# Patient Record
Sex: MALE | Race: BLACK OR AFRICAN AMERICAN | NOT HISPANIC OR LATINO | Employment: UNEMPLOYED | URBAN - METROPOLITAN AREA
[De-identification: names, ages, dates, MRNs, and addresses within clinical notes are randomized per-mention and may not be internally consistent; named-entity substitution may affect disease eponyms.]

---
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
## 2023-01-04 ENCOUNTER — HOSPITAL ENCOUNTER (EMERGENCY)
Facility: HOSPITAL | Age: 18
Discharge: HOME | End: 2023-01-04
Attending: EMERGENCY MEDICINE
Payer: MEDICAID

## 2023-01-04 VITALS
OXYGEN SATURATION: 100 % | SYSTOLIC BLOOD PRESSURE: 118 MMHG | DIASTOLIC BLOOD PRESSURE: 62 MMHG | HEIGHT: 66 IN | HEART RATE: 76 BPM | RESPIRATION RATE: 16 BRPM | WEIGHT: 120 LBS | TEMPERATURE: 99 F | BODY MASS INDEX: 19.29 KG/M2

## 2023-01-04 DIAGNOSIS — R21 RASH AND NONSPECIFIC SKIN ERUPTION: Primary | ICD-10-CM

## 2023-01-04 DIAGNOSIS — U07.1 COVID-19: ICD-10-CM

## 2023-01-04 DIAGNOSIS — R30.0 DYSURIA: ICD-10-CM

## 2023-01-04 LAB
BACTERIA #/AREA URNS HPF: ABNORMAL /HPF
BILIRUB UR QL STRIP: NEGATIVE
CLARITY UR: CLEAR
COLOR UR: YELLOW
CTP QC/QA: YES
CTP QC/QA: YES
GLUCOSE UR QL STRIP: NEGATIVE
HGB UR QL STRIP: ABNORMAL
HYALINE CASTS #/AREA URNS LPF: 0 /LPF
KETONES UR QL STRIP: NEGATIVE
LEUKOCYTE ESTERASE UR QL STRIP: ABNORMAL
MICROSCOPIC COMMENT: ABNORMAL
NITRITE UR QL STRIP: NEGATIVE
PH UR STRIP: 7 [PH] (ref 5–8)
POC MOLECULAR INFLUENZA A AGN: NEGATIVE
POC MOLECULAR INFLUENZA B AGN: NEGATIVE
POCT GLUCOSE: 60 MG/DL (ref 70–110)
PROT UR QL STRIP: ABNORMAL
RBC #/AREA URNS HPF: 84 /HPF (ref 0–4)
SARS-COV-2 RDRP RESP QL NAA+PROBE: POSITIVE
SP GR UR STRIP: >1.03 (ref 1–1.03)
URN SPEC COLLECT METH UR: ABNORMAL
UROBILINOGEN UR STRIP-ACNC: NEGATIVE EU/DL
WBC #/AREA URNS HPF: 77 /HPF (ref 0–5)
WBC CLUMPS URNS QL MICRO: ABNORMAL

## 2023-01-04 PROCEDURE — 87635 SARS-COV-2 COVID-19 AMP PRB: CPT | Performed by: EMERGENCY MEDICINE

## 2023-01-04 PROCEDURE — 25000003 PHARM REV CODE 250: Performed by: EMERGENCY MEDICINE

## 2023-01-04 PROCEDURE — 99284 EMERGENCY DEPT VISIT MOD MDM: CPT

## 2023-01-04 PROCEDURE — 82962 GLUCOSE BLOOD TEST: CPT

## 2023-01-04 PROCEDURE — 87591 N.GONORRHOEAE DNA AMP PROB: CPT | Performed by: EMERGENCY MEDICINE

## 2023-01-04 PROCEDURE — 87491 CHLMYD TRACH DNA AMP PROBE: CPT | Performed by: EMERGENCY MEDICINE

## 2023-01-04 PROCEDURE — 96372 THER/PROPH/DIAG INJ SC/IM: CPT | Performed by: EMERGENCY MEDICINE

## 2023-01-04 PROCEDURE — 63600175 PHARM REV CODE 636 W HCPCS: Performed by: EMERGENCY MEDICINE

## 2023-01-04 PROCEDURE — 81000 URINALYSIS NONAUTO W/SCOPE: CPT | Performed by: EMERGENCY MEDICINE

## 2023-01-04 PROCEDURE — 87086 URINE CULTURE/COLONY COUNT: CPT | Performed by: EMERGENCY MEDICINE

## 2023-01-04 RX ORDER — CEFTRIAXONE 500 MG/1
0.5 INJECTION, POWDER, FOR SOLUTION INTRAMUSCULAR; INTRAVENOUS
Status: DISCONTINUED | OUTPATIENT
Start: 2023-01-04 | End: 2023-01-04

## 2023-01-04 RX ORDER — IBUPROFEN 600 MG/1
600 TABLET ORAL EVERY 8 HOURS PRN
Qty: 15 TABLET | Refills: 0 | Status: SHIPPED | OUTPATIENT
Start: 2023-01-04

## 2023-01-04 RX ORDER — DOXYCYCLINE 100 MG/1
100 CAPSULE ORAL 2 TIMES DAILY
Qty: 14 CAPSULE | Refills: 0 | Status: SHIPPED | OUTPATIENT
Start: 2023-01-04 | End: 2023-01-11

## 2023-01-04 RX ADMIN — CEFTRIAXONE 500 MG: 1 INJECTION, POWDER, FOR SOLUTION INTRAMUSCULAR; INTRAVENOUS at 06:01

## 2023-01-04 NOTE — ED PROVIDER NOTES
Encounter Date: 1/4/2023       History     Chief Complaint   Patient presents with    Rash     Pt reports rash, fever, nausea, and, abdominal pain x 1 week. Denies taking medication today.     Mr. Ernst reports about a week of a diffuse headache with pain behind his eyes. Denies neck pain. Is still able to perform normal adls. Also reports congestion, post nasal drip and mild cough. Has had a mild itching rash to his upper arms for a few days. Denies chest pain or sob. He also reports a few days of some dysuria with mild blood noted with urine intermittently. He states his girlfriend told him she had a STD a few weeks ago but he has never been evaluated or treated. He can't recall the name. He denies penile discharge or pain or scrotal swelling or pain.       Review of patient's allergies indicates:  No Known Allergies  Past Medical History:   Diagnosis Date    Asthma      Past Surgical History:   Procedure Laterality Date    LAPAROSCOPIC APPENDECTOMY N/A 11/25/2019    Procedure: APPENDECTOMY, LAPAROSCOPIC;  Surgeon: Lynda Chau MD;  Location: [facility] OR [address] [place] [address];  Service: Pediatrics;  Laterality: N/A;     No family history on file.  Social History     Tobacco Use    Smoking status: Never     Passive exposure: Yes   Substance Use Topics    Drug use: Never     Review of Systems   Constitutional:  Negative for chills and fatigue.   HENT:  Positive for congestion, postnasal drip, rhinorrhea and sore throat. Negative for facial swelling and mouth sores.    Respiratory:  Positive for cough. Negative for chest tightness, shortness of breath and wheezing.    Cardiovascular: Negative.    Gastrointestinal:  Negative for diarrhea, nausea and vomiting.   Genitourinary:  Positive for dysuria, frequency, hematuria and urgency. Negative for difficulty urinating, flank pain, genital sores, penile discharge, penile pain, penile swelling and scrotal swelling.   Neurological:  Positive for headaches. Negative for dizziness,  seizures, syncope, speech difficulty, light-headedness and numbness.   Hematological: Negative.    Psychiatric/Behavioral: Negative.     All other systems reviewed and are negative.    Physical Exam     Initial Vitals [01/04/23 1546]   BP Pulse Resp Temp SpO2   (!) 128/58 98 16 100 °F (37.8 °C) 100 %      MAP       --         Physical Exam    Nursing note and vitals reviewed.  Constitutional: He appears well-developed and well-nourished. He is not diaphoretic. No distress.   HENT:   Head: Normocephalic and atraumatic.   Mouth/Throat: Oropharynx is clear and moist. No oropharyngeal exudate.   Eyes: Conjunctivae and EOM are normal.   Neck: Neck supple.   Normal range of motion.  Cardiovascular:  Normal rate, regular rhythm and normal heart sounds.           No murmur heard.  Pulmonary/Chest: No stridor. No respiratory distress. He has no wheezes.   Abdominal: Abdomen is soft.   Genitourinary:    Penis normal.   No discharge found.    Genitourinary Comments: Small R inguinal lymph node swelling. No other abnl. Normal scrotal exam. No perineal swelling or ttp.      Musculoskeletal:         General: No tenderness or edema. Normal range of motion.      Cervical back: Normal range of motion and neck supple.     Lymphadenopathy:     He has no cervical adenopathy.   Neurological: He is alert and oriented to person, place, and time. He has normal strength. GCS score is 15. GCS eye subscore is 4. GCS verbal subscore is 5. GCS motor subscore is 6.   Skin: Skin is warm. Capillary refill takes less than 2 seconds. No erythema.   Mild nonspecific rash to L upper arm and lesser to forehead along scalp line w mild sandpaper appearance most c/w dermatitis. No vesicles, bulla, petechiae, or pustules.    Psychiatric: He has a normal mood and affect. Thought content normal.       ED Course   Procedures  Labs Reviewed   URINALYSIS, REFLEX TO URINE CULTURE - Abnormal; Notable for the following components:       Result Value    Specific  Gravity, UA >1.030 (*)     Protein, UA 1+ (*)     Occult Blood UA 2+ (*)     Leukocytes, UA 2+ (*)     All other components within normal limits    Narrative:     Specimen Source->Urine   URINALYSIS MICROSCOPIC - Abnormal; Notable for the following components:    RBC, UA 84 (*)     WBC, UA 77 (*)     Bacteria Few (*)     All other components within normal limits    Narrative:     Specimen Source->Urine   SARS-COV-2 RDRP GENE - Abnormal; Notable for the following components:    POC Rapid COVID Positive (*)     All other components within normal limits   POCT GLUCOSE - Abnormal; Notable for the following components:    POCT Glucose 60 (*)     All other components within normal limits   CULTURE, URINE    Narrative:     Specimen Source->Urine   C. TRACHOMATIS/N. GONORRHOEAE BY AMP DNA   POCT INFLUENZA A/B MOLECULAR          Imaging Results    None          Medications   cefTRIAXone (ROCEPHIN) 500 mg in LIDOcaine HCL 10 mg/ml (1%) 2 mL IM only syringe (500 mg Intramuscular Given 1/4/23 1852)     Medical Decision Making:   Clinical Tests:   Lab Tests: Ordered and Reviewed  ED Management:  Non toxic well appearing 16 yo here with sig other and mother.   Mild rash most c/w contact dermatitis and small LAD in R groin, no other abnl noted.   Normal gait. Has tested positive for covid. Is not vaccinated. No prior hx of covid.   We discussed sxs and home care. He is ambulating well on room air without any distress or sob or tachypnea.   We discussed penile sxs. Will test for gc/chl and will treat for presumptive sti. No ulcers or sores to suspect other additional etiology at this time.   Rocephin IM given, doxy prescribed.   We discussed home care and return precautions.   Also of note, his glucose was 60, given juice and crackers. He is healthy at baseline with no past med hx and is non toxic today. He states he hasn't eaten today. Will eat when he gets home. We discussed the need to hydrate and follow up w pcp.   Discussed  home care and return precautions. There is no indication for further emergent intervention or evaluation at this time.                           Clinical Impression:   Final diagnoses:  [R21] Rash and nonspecific skin eruption (Primary)  [U07.1] COVID-19  [R30.0] Dysuria        ED Disposition Condition    Discharge Stable          ED Prescriptions       Medication Sig Dispense Start Date End Date Auth. Provider    doxycycline (VIBRAMYCIN) 100 MG Cap Take 1 capsule (100 mg total) by mouth 2 (two) times daily. for 7 days 14 capsule 1/4/2023 1/11/2023 Brooklyn Kimble MD    ibuprofen (ADVIL,MOTRIN) 600 MG tablet Take 1 tablet (600 mg total) by mouth every 8 (eight) hours as needed for Pain. 15 tablet 1/4/2023 -- Brooklyn Kimble MD          Follow-up Information       Follow up With Specialties Details Why Contact [facility] - Emergency Dept Emergency Medicine  As needed, If symptoms worsen Brittni Walker  [facility] [address] [phone]             Brooklyn Kimble MD  01/05/23 2018

## 2023-01-04 NOTE — Clinical Note
"Daniel Buchananroselia Ernst was seen and treated in our emergency department on 1/4/2023.  He may return to work on 01/11/2023.  May return sooner if cleared by work due to duration of symptoms.      If you have any questions or concerns, please don't hesitate to call.      Brooklyn Kimble MD"

## 2023-01-05 ENCOUNTER — TELEPHONE (OUTPATIENT)
Dept: EMERGENCY MEDICINE | Facility: HOSPITAL | Age: 18
End: 2023-01-05
Payer: MEDICAID

## 2023-01-05 NOTE — DISCHARGE INSTRUCTIONS
Thank you for your patience. It was a pleasure taking care of you.   Rest. Drink plenty of fluids.   Alternate ibuprofen and tylenol as discussed.   Return for any new or acute problems or concerns.

## 2023-01-06 LAB
BACTERIA UR CULT: NORMAL
C TRACH DNA SPEC QL NAA+PROBE: DETECTED
N GONORRHOEA DNA SPEC QL NAA+PROBE: NOT DETECTED

## 2023-04-19 ENCOUNTER — HOSPITAL ENCOUNTER (EMERGENCY)
Facility: HOSPITAL | Age: 18
Discharge: HOME | End: 2023-04-19
Attending: EMERGENCY MEDICINE
Payer: MEDICAID

## 2023-04-19 VITALS
RESPIRATION RATE: 18 BRPM | HEIGHT: 64 IN | TEMPERATURE: 99 F | BODY MASS INDEX: 20.83 KG/M2 | HEART RATE: 56 BPM | DIASTOLIC BLOOD PRESSURE: 63 MMHG | WEIGHT: 122 LBS | OXYGEN SATURATION: 98 % | SYSTOLIC BLOOD PRESSURE: 136 MMHG

## 2023-04-19 DIAGNOSIS — J06.9 VIRAL URI WITH COUGH: Primary | ICD-10-CM

## 2023-04-19 LAB
CTP QC/QA: YES
CTP QC/QA: YES
POC MOLECULAR INFLUENZA A AGN: NEGATIVE
POC MOLECULAR INFLUENZA B AGN: NEGATIVE
SARS-COV-2 RDRP RESP QL NAA+PROBE: NEGATIVE

## 2023-04-19 PROCEDURE — 99284 EMERGENCY DEPT VISIT MOD MDM: CPT

## 2023-04-19 PROCEDURE — 25000003 PHARM REV CODE 250

## 2023-04-19 PROCEDURE — 87502 INFLUENZA DNA AMP PROBE: CPT

## 2023-04-19 RX ORDER — BENZONATATE 100 MG/1
100 CAPSULE ORAL 3 TIMES DAILY PRN
Qty: 30 CAPSULE | Refills: 0 | Status: SHIPPED | OUTPATIENT
Start: 2023-04-19

## 2023-04-19 RX ORDER — IBUPROFEN 600 MG/1
600 TABLET ORAL
Status: COMPLETED | OUTPATIENT
Start: 2023-04-19 | End: 2023-04-19

## 2023-04-19 RX ORDER — BUTALBITAL, ACETAMINOPHEN AND CAFFEINE 50; 325; 40 MG/1; MG/1; MG/1
1 TABLET ORAL EVERY 4 HOURS PRN
Qty: 10 TABLET | Refills: 0 | Status: SHIPPED | OUTPATIENT
Start: 2023-04-19

## 2023-04-19 RX ORDER — FLUTICASONE PROPIONATE 50 MCG
1 SPRAY, SUSPENSION (ML) NASAL 2 TIMES DAILY PRN
Qty: 15 G | Refills: 0 | Status: SHIPPED | OUTPATIENT
Start: 2023-04-19

## 2023-04-19 RX ORDER — ONDANSETRON 4 MG/1
4 TABLET, ORALLY DISINTEGRATING ORAL EVERY 6 HOURS PRN
Qty: 15 TABLET | Refills: 0 | Status: SHIPPED | OUTPATIENT
Start: 2023-04-19

## 2023-04-19 RX ORDER — PROMETHAZINE HYDROCHLORIDE AND DEXTROMETHORPHAN HYDROBROMIDE 6.25; 15 MG/5ML; MG/5ML
5 SYRUP ORAL EVERY 4 HOURS PRN
Qty: 118 ML | Refills: 0 | Status: SHIPPED | OUTPATIENT
Start: 2023-04-19

## 2023-04-19 RX ORDER — CETIRIZINE HYDROCHLORIDE 10 MG/1
10 TABLET ORAL DAILY PRN
Qty: 30 TABLET | Refills: 0 | Status: SHIPPED | OUTPATIENT
Start: 2023-04-19

## 2023-04-19 RX ORDER — IBUPROFEN 600 MG/1
600 TABLET ORAL EVERY 6 HOURS PRN
Qty: 20 TABLET | Refills: 0 | Status: SHIPPED | OUTPATIENT
Start: 2023-04-19

## 2023-04-19 RX ADMIN — IBUPROFEN 600 MG: 600 TABLET ORAL at 10:04

## 2023-04-19 NOTE — Clinical Note
"Daniel Sim" Klever was seen and treated in our emergency department on 4/19/2023.  He may return to work on 04/20/2023.       If you have any questions or concerns, please don't hesitate to call.      Alayna Holdsworth, PA-C"

## 2023-04-19 NOTE — DISCHARGE INSTRUCTIONS
Thank you for coming to our Emergency Department today. It is important to remember that some problems are difficult to diagnose and may not be found during your first visit. Be sure to follow up with your primary care doctor and review any labs/imaging that was performed with them. If you do not have a primary care doctor, you may contact the one listed on your discharge paperwork or you may also call the [facility] Appointment Desk at [phone] to schedule an appointment with one.     All medications may potentially have side effects and it is impossible to predict which medications may give you side effects. If you feel that you are having a negative effect of any medication you should immediately stop taking them and seek medical attention.    Return to the ER with any questions/concerns, new/concerning symptoms, worsening or failure to improve. Do not drive or make any important decisions for 24 hours if you have received any pain medications, sedatives or mood altering drugs during your ER visit.

## 2023-04-19 NOTE — ED PROVIDER NOTES
Encounter Date: 4/19/2023       History     Chief Complaint   Patient presents with    COVID-19 Concerns     16 yo male to triage for cough, runny nose, fever, chills, body aches, HA x 2 days. Pt also complaining of inner right cheek pain after biting it last night. VSS, NAD, AAOx4     CC: cough    HPI: This is a 17 y.o.male patient, with a PMHx of asthma, presenting to the ED for further evaluation of cough beginning 2 days ago.  Patient complaining of a frontal headache that is throbbing and comes and goes, he rates it a 10/10.  Has not taken anything to make this better.  States sound makes it worse.  Patient denies any trauma.  Patient states his nephews sick with similar symptoms but no diagnosis.  Patient admits to subjective fever, sneezing, congestion, dry cough, nausea, body aches.  Patient denies sweats, chills, runny nose, sore throat, shortness breath, chest pain, abdominal pain, vomiting, diarrhea, constipation, urinary symptoms, dizziness, lightheadedness, and visual disturbances.      The history is provided by the patient. No  was used.   Review of patient's allergies indicates:  No Known Allergies  Past Medical History:   Diagnosis Date    Asthma      Past Surgical History:   Procedure Laterality Date    LAPAROSCOPIC APPENDECTOMY N/A 11/25/2019    Procedure: APPENDECTOMY, LAPAROSCOPIC;  Surgeon: Lynda Chau MD;  Location: [facility] OR [address] [place] [address];  Service: Pediatrics;  Laterality: N/A;     History reviewed. No pertinent family history.  Social History     Tobacco Use    Smoking status: Never     Passive exposure: Yes   Substance Use Topics    Alcohol use: Not Currently    Drug use: Never     Review of Systems   Constitutional:  Positive for fever (Subjective). Negative for chills and diaphoresis.   HENT:  Positive for congestion and sneezing. Negative for rhinorrhea and sore throat.    Eyes:  Negative for visual disturbance.   Respiratory:  Positive for cough. Negative for  shortness of breath.    Cardiovascular:  Negative for chest pain.   Gastrointestinal:  Positive for nausea. Negative for abdominal pain, constipation, diarrhea and vomiting.   Genitourinary:  Negative for decreased urine volume, difficulty urinating, dysuria, frequency and urgency.   Musculoskeletal:  Positive for myalgias (body aches). Negative for back pain.   Skin:  Negative for rash.   Neurological:  Positive for headaches. Negative for dizziness, weakness, light-headedness and numbness.     Physical Exam     Initial Vitals [04/19/23 0937]   BP Pulse Resp Temp SpO2   136/63 (!) 56 18 98.8 °F (37.1 °C) 98 %      MAP       --         Physical Exam    Nursing note and vitals reviewed.  Constitutional: He appears well-developed and well-nourished. He is not diaphoretic. He is active. He does not appear ill. No distress.   HENT:   Head: Normocephalic and atraumatic.   Right Ear: Tympanic membrane, external ear and ear canal normal.   Left Ear: Tympanic membrane, external ear and ear canal normal.   Nose: Nose normal.   Mouth/Throat: Uvula is midline, oropharynx is clear and moist and mucous membranes are normal.   Eyes: Conjunctivae, EOM and lids are normal. Pupils are equal, round, and reactive to light. Right eye exhibits no discharge. Left eye exhibits no discharge. No scleral icterus.   Neck: Neck supple.   Normal range of motion.   Full passive range of motion without pain.     Cardiovascular:  Normal rate and regular rhythm.           Pulmonary/Chest: Effort normal and breath sounds normal. No respiratory distress.   Abdominal: He exhibits no distension.   Musculoskeletal:         General: Normal range of motion.      Cervical back: Full passive range of motion without pain, normal range of motion and neck supple.     Neurological: He is alert and oriented to person, place, and time. GCS eye subscore is 4. GCS verbal subscore is 5. GCS motor subscore is 6.   Skin: Skin is dry. Capillary refill takes less than 2  seconds.       ED Course   Procedures  Labs Reviewed   POCT INFLUENZA A/B MOLECULAR   SARS-COV-2 RDRP GENE          Imaging Results    None          Medications   ibuprofen tablet 600 mg (600 mg Oral Given 4/19/23 1051)     Medical Decision Making:   History:   Old Medical Records: I decided to obtain old medical records.  Initial Assessment:   17 y.o.male patient, with a PMHx of asthma, presenting to the ED for further evaluation of cough.  Patient's chart and medical history reviewed.  Differential Diagnosis:   COVID  Flu  Strep throat  Viral URI  Clinical Tests:   Lab Tests: Ordered and Reviewed  ED Management:  Patient's vitals reviewed.  He is afebrile, no respiratory distress, nontoxic-appearing in the ED. patient's physical exam is unremarkable.  Patient given Motrin for his headache.  Patient is COVID and flu negative. Discussed with patient this is most likely a viral upper respiratory infection which will take time to clear from his system.  Discussed with patient to stay well rested and hydrated.  Discussed with him he can use ibuprofen and Tylenol as needed for the body aches.  I will send the patient home with high dose Motrin, Fioricet, Zofran, Flonase, Zyrtec, Tessalon Perles, and promethazine DM constant for symptomatic control. Patient agrees with this plan. Discussed with him strict return precautions, he verbalized understanding. Patient is stable for discharge.                           Clinical Impression:   Final diagnoses:  [J06.9] Viral URI with cough (Primary)        ED Disposition Condition    Discharge Stable          ED Prescriptions       Medication Sig Dispense Start Date End Date Auth. Provider    ibuprofen (ADVIL,MOTRIN) 600 MG tablet Take 1 tablet (600 mg total) by mouth every 6 (six) hours as needed for Pain. 20 tablet 4/19/2023 -- Alayna Holdsworth, PA-C    butalbital-acetaminophen-caffeine -40 mg (FIORICET, ESGIC) -40 mg per tablet Take 1 tablet by mouth every 4 (four)  hours as needed for Pain or Headaches. 10 tablet 4/19/2023 -- Alayna Holdsworth, PA-C    fluticasone propionate (FLONASE) 50 mcg/actuation nasal spray 1 spray (50 mcg total) by Each Nostril route 2 (two) times daily as needed for Rhinitis or Allergies. 15 g 4/19/2023 -- Alayna Holdsworth, PA-C    cetirizine (ZYRTEC) 10 MG tablet Take 1 tablet (10 mg total) by mouth daily as needed for Allergies or Rhinitis. 30 tablet 4/19/2023 -- Alayna Holdsworth, PA-C    promethazine-dextromethorphan (PROMETHAZINE-DM) 6.25-15 mg/5 mL Syrp Take 5 mLs by mouth every 4 (four) hours as needed (cough/congestion). 118 mL 4/19/2023 -- Alayna Holdsworth, PA-C    benzonatate (TESSALON) 100 MG capsule Take 1 capsule (100 mg total) by mouth 3 (three) times daily as needed for Cough. 30 capsule 4/19/2023 -- Alayna Holdsworth, PA-C    ondansetron (ZOFRAN-ODT) 4 MG TbDL Take 1 tablet (4 mg total) by mouth every 6 (six) hours as needed (Nausea). 15 tablet 4/19/2023 -- Alayna Holdsworth, PA-C          Follow-up Information       Follow up With Specialties Details Why Contact [facility] - Emergency Dept Emergency Medicine  If symptoms worsen [address] [place] [address] [phone]                 Alayna Holdsworth, PA-C  04/19/23 3613

## 2023-06-30 ENCOUNTER — PATIENT MESSAGE (OUTPATIENT)
Dept: RESEARCH | Facility: HOSPITAL | Age: 18
End: 2023-06-30
Payer: MEDICAID

## 2023-11-06 ENCOUNTER — HOSPITAL ENCOUNTER (EMERGENCY)
Facility: OTHER | Age: 18
Discharge: HOME | End: 2023-11-06
Attending: EMERGENCY MEDICINE
Payer: MEDICAID

## 2023-11-06 VITALS
WEIGHT: 120 LBS | OXYGEN SATURATION: 98 % | RESPIRATION RATE: 18 BRPM | BODY MASS INDEX: 20.6 KG/M2 | DIASTOLIC BLOOD PRESSURE: 70 MMHG | TEMPERATURE: 99 F | SYSTOLIC BLOOD PRESSURE: 122 MMHG | HEART RATE: 52 BPM

## 2023-11-06 DIAGNOSIS — S61.012A LACERATION OF LEFT THUMB WITHOUT FOREIGN BODY WITHOUT DAMAGE TO NAIL, INITIAL ENCOUNTER: Primary | ICD-10-CM

## 2023-11-06 LAB
HCV AB SERPL QL IA: NEGATIVE
HIV 1+2 AB+HIV1 P24 AG SERPL QL IA: NEGATIVE

## 2023-11-06 PROCEDURE — 90471 IMMUNIZATION ADMIN: CPT

## 2023-11-06 PROCEDURE — 63600175 PHARM REV CODE 636 W HCPCS

## 2023-11-06 PROCEDURE — 86803 HEPATITIS C AB TEST: CPT | Performed by: EMERGENCY MEDICINE

## 2023-11-06 PROCEDURE — 90715 TDAP VACCINE 7 YRS/> IM: CPT

## 2023-11-06 PROCEDURE — 25000003 PHARM REV CODE 250

## 2023-11-06 PROCEDURE — 99285 EMERGENCY DEPT VISIT HI MDM: CPT | Mod: 25

## 2023-11-06 PROCEDURE — 87389 HIV-1 AG W/HIV-1&-2 AB AG IA: CPT | Performed by: EMERGENCY MEDICINE

## 2023-11-06 RX ORDER — ACETAMINOPHEN 500 MG
1000 TABLET ORAL
Status: COMPLETED | OUTPATIENT
Start: 2023-11-06 | End: 2023-11-06

## 2023-11-06 RX ORDER — IBUPROFEN 600 MG/1
600 TABLET ORAL
Status: COMPLETED | OUTPATIENT
Start: 2023-11-06 | End: 2023-11-06

## 2023-11-06 RX ADMIN — BACITRACIN ZINC, NEOMYCIN, POLYMYXIN B: 400; 3.5; 5 OINTMENT TOPICAL at 12:11

## 2023-11-06 RX ADMIN — ACETAMINOPHEN 1000 MG: 500 TABLET ORAL at 12:11

## 2023-11-06 RX ADMIN — IBUPROFEN 600 MG: 600 TABLET, FILM COATED ORAL at 12:11

## 2023-11-06 RX ADMIN — TETANUS TOXOID, REDUCED DIPHTHERIA TOXOID AND ACELLULAR PERTUSSIS VACCINE, ADSORBED 0.5 ML: 5; 2.5; 8; 8; 2.5 SUSPENSION INTRAMUSCULAR at 12:11

## 2023-11-06 NOTE — FIRST PROVIDER EVALUATION
Emergency Department TeleTriage Encounter Note      CHIEF COMPLAINT    Chief Complaint   Patient presents with    Laceration     Lac to left thumb(currently wrapped and unable to visualize) since yesterday. He states that when he saw the blood yesterday from his thumb, he had a syncopal episode       VITAL SIGNS   Initial Vitals [11/06/23 1049]   BP Pulse Resp Temp SpO2   124/68 (!) 47 18 98.6 °F (37 °C) 98 %      MAP       --            ALLERGIES    Review of patient's allergies indicates:  No Known Allergies    PROVIDER TRIAGE NOTE  Patient presents with complaint of cutting his left thumb approximately 13 hours prior to arrival.  He states after seeing the blood he became hot and passed out.  He reports continued pain to the hand.      Phy:   Constitutional: well nourished, well developed, appearing stated age, NAD        Initial orders will be placed and care will be transferred to an alternate provider when patient is roomed for a full evaluation. Any additional orders and the final disposition will be determined by that provider.        ORDERS  Labs Reviewed - No data to display    ED Orders (720h ago, onward)      None              Virtual Visit Note: The provider triage portion of this emergency department evaluation and documentation was performed via [employer], a HIPAA-compliant telemedicine application, in concert with a tele-presenter in the room. A face to face patient evaluation with one of my colleagues will occur once the patient is placed in an emergency department room.      DISCLAIMER: This note was prepared with [employer] voice recognition transcription software. Garbled syntax, mangled pronouns, and other bizarre constructions may be attributed to that software system.

## 2023-11-06 NOTE — ED PROVIDER NOTES
Encounter Date: 11/6/2023       History     Chief Complaint   Patient presents with    Laceration     Lac to left thumb(currently wrapped and unable to visualize) since yesterday. He states that when he saw the blood yesterday from his thumb, he had a syncopal episode     Daniel Ernst is a 18 y.o. R hand dominant male presenting to the emergency department for evaluation of small laceration to the left thumb. Patient states that he was moving furniture around 9 pm last night when he accidentally cut his thumb on some metal. He states that he only cleaned the wound with water. He is not on blood thinners. Tetanus is not up to date. He reports some pain to the left thumb. Patient states that while he was cleaning the wound, he saw blood, passed out and hit his head on the ground. He currently reports headaches located near the occipital region. He denies dizziness, light-headedness, weakness, paresthesias, vision changes. No treatment prior to arrival.       The history is provided by the patient.     Review of patient's allergies indicates:  No Known Allergies  Past Medical History:   Diagnosis Date    Asthma      Past Surgical History:   Procedure Laterality Date    LAPAROSCOPIC APPENDECTOMY N/A 11/25/2019    Procedure: APPENDECTOMY, LAPAROSCOPIC;  Surgeon: Lynda Chau MD;  Location: [facility] OR [address] [place] [address];  Service: Pediatrics;  Laterality: N/A;     No family history on file.  Social History     Tobacco Use    Smoking status: Never     Passive exposure: Yes   Substance Use Topics    Alcohol use: Not Currently    Drug use: Never     Review of Systems   Constitutional:  Negative for chills and fever.   HENT:  Negative for congestion, rhinorrhea and sore throat.    Respiratory:  Negative for cough and shortness of breath.    Cardiovascular:  Negative for chest pain.   Gastrointestinal:  Negative for abdominal pain, diarrhea, nausea and vomiting.   Genitourinary:  Negative for dysuria, frequency and urgency.    Musculoskeletal:  Negative for back pain.        Positive for left thumb pain.    Skin:  Positive for wound (cut to left thumb). Negative for rash.   Neurological:  Positive for syncope and headaches. Negative for dizziness.   Psychiatric/Behavioral:  Negative for confusion.        Physical Exam     Initial Vitals [11/06/23 1049]   BP Pulse Resp Temp SpO2   124/68 (!) 47 18 98.6 °F (37 °C) 98 %      MAP       --         Physical Exam    Vitals reviewed.  Constitutional: He appears well-developed and well-nourished. No distress.   HENT:   Head: Normocephalic and atraumatic. Head is without raccoon's eyes and without Naranjo's sign.   Right Ear: Tympanic membrane, external ear and ear canal normal. No mastoid tenderness. No hemotympanum.   Left Ear: Tympanic membrane, external ear and ear canal normal. No mastoid tenderness. No hemotympanum.   Nose: Nose normal.   Mouth/Throat: Oropharynx is clear and moist.   Eyes: Conjunctivae and EOM are normal. Pupils are equal, round, and reactive to light.   Neck: Neck supple.   Normal range of motion.  Cardiovascular:  Normal rate, regular rhythm, normal heart sounds and intact distal pulses.           Pulses:       Radial pulses are 2+ on the left side.   Pulmonary/Chest: Breath sounds normal. No respiratory distress. He has no wheezes. He has no rhonchi. He has no rales. He exhibits no tenderness.   Musculoskeletal:         General: No edema.      Cervical back: Normal range of motion and neck supple.      Comments: Limited ROM of left thumb secondary to pain.  Good range of motion with isolated testing.     Neurological: He is alert and oriented to person, place, and time. He has normal strength. No cranial nerve deficit or sensory deficit. GCS score is 15. GCS eye subscore is 4. GCS verbal subscore is 5. GCS motor subscore is 6.   No focal neurologic deficits.  Sensation intact to light touch.  Neurovascularly intact.   Skin: Skin is warm and dry. Capillary refill takes  less than 2 seconds.   Approximately 0.5 cm linear laceration to dorsal aspect of base of left thumb. Able to visualize entire depth of wound. No foreign body visualized. No ligament or tendon involvement.    Psychiatric: He has a normal mood and affect. His behavior is normal. Judgment and thought content normal.         ED Course   Lac Repair    Date/Time: 11/7/2023 6:24 PM    Performed by: German Rosales PA-C  Authorized by: Tariq Donohue MD    Consent:     Consent obtained:  Verbal    Consent given by:  Patient    Risks, benefits, and alternatives were discussed: yes      Risks discussed:  Infection, pain, poor cosmetic result, need for additional repair and poor wound healing    Alternatives discussed:  No treatment  Universal protocol:     Procedure explained and questions answered to patient or proxy's satisfaction: yes      Relevant documents present and verified: yes      Patient identity confirmed:  Verbally with patient and arm band  Anesthesia:     Anesthesia method:  None  Laceration details:     Location:  Finger    Finger location:  L thumb    Length (cm):  0.5  Pre-procedure details:     Preparation:  Patient was prepped and draped in usual sterile fashion  Exploration:     Limited defect created (wound extended): no      Hemostasis achieved with:  Direct pressure    Wound exploration: wound explored through full range of motion and entire depth of wound visualized      Contaminated: no    Treatment:     Area cleansed with:  Povidone-iodine    Amount of cleaning:  Standard    Irrigation solution:  Sterile saline    Irrigation method:  Pressure wash    Debridement:  None    Undermining:  None    Scar revision: no    Skin repair:     Repair method:  Sutures and Steri-Strips    Wound skin closure material used: ethilon.    Number of Steri-Strips:  1  Approximation:     Approximation:  Close  Repair type:     Repair type:  Simple  Post-procedure details:     Dressing:  Antibiotic ointment and  adhesive bandage    Procedure completion:  Tolerated well, no immediate complications    Labs Reviewed   HIV 1 / 2 ANTIBODY    Narrative:     Release to patient->Immediate   HEPATITIS C ANTIBODY    Narrative:     Release to patient->Immediate          Imaging Results              X-Ray Finger 2 or More Views Left (Final result)  Result time 11/06/23 12:55:27      Final result by Althea Mason MD (11/06/23 12:55:27)                   Impression:      As above      Electronically signed by: Althea Mason MD  Date:    11/06/2023  Time:    12:55               Narrative:    EXAMINATION:  XR FINGER 2 OR MORE VIEWS LEFT    CLINICAL HISTORY:  left thumb pain/laceration;    TECHNIQUE:  AP, oblique and lateral view    COMPARISON:  None    FINDINGS:  Normal alignment, normal mineralization.  No fracture, no osseous lesions, no abnormal periosteal reaction.  Small soft tissue defect at the base of the thumb, no soft tissue air, no radiopaque foreign body.                                       CT Head Without Contrast (Final result)  Result time 11/06/23 12:36:08      Final result by Saeid Renteria MD (11/06/23 12:36:08)                   Impression:      No acute intracranial abnormality.      Electronically signed by: Saeid Renteria MD  Date:    11/06/2023  Time:    12:36               Narrative:    EXAMINATION:  CT HEAD WITHOUT CONTRAST    CLINICAL HISTORY:  Head trauma, moderate-severe;    TECHNIQUE:  Low dose axial CT images obtained throughout the head without intravenous contrast. Sagittal and coronal reconstructions were performed.    COMPARISON:  None.    FINDINGS:  Intracranial compartment: Brain appears normally formed.    Ventricles and sulci are normal in size for age without evidence of hydrocephalus. No extra-axial blood or fluid collections.    The brain parenchyma appears normal. No parenchymal mass, hemorrhage, edema or major vascular distribution infarct.    Skull/extracranial contents (limited  evaluation): No fracture. Mastoid air cells and paranasal sinuses are essentially clear.  Imaged portions of the orbits are within normal limits.                                       Medications   Tdap (BOOSTRIX) vaccine injection 0.5 mL (0.5 mLs Intramuscular Given 11/6/23 1237)   acetaminophen tablet 1,000 mg (1,000 mg Oral Given 11/6/23 1235)   ibuprofen tablet 600 mg (600 mg Oral Given 11/6/23 1235)   neomycin-bacitracin-polymyxin ointment ( Topical (Top) Given 11/6/23 1237)     Medical Decision Making  Amount and/or Complexity of Data Reviewed  Labs: ordered.  Radiology: ordered.    Risk  OTC drugs.  Prescription drug management.                          Medical Decision Making:   Initial Assessment:   Urgent evaluation of healthy 18-year-old male who presents with laceration to the dorsal aspect of the base of the left thumb.  States that he saw blood while cleaning the wound and passed out.  Patient states that he did hit his head.  He is not on blood thinners.  Tetanus is not up-to-date.  On exam, he is well-appearing and nontoxic.  Hemodynamically stable.  Afebrile in the ED. no focal neurological deficits.  No hematoma or signs of head trauma.  He does have approximately 0.5 cm linear laceration to the dorsal aspect of the base of the left thumb.  No active bleeding.  He does have full range of motion of the left thumb with isolated testing.  Able to visualize the entire depth of the wound.  No foreign body.  Given that the wound occurred more than 12 hours ago, will not close with sutures.  Plan for Steri-Strips and bacitracin ointment.  Will obtain CT head given head trauma along with syncope.  Will update tetanus.  Clinical Tests:   Radiological Study: Ordered and Reviewed  ED Management:  On review of CT head, no acute intracranial abnormality.  I updated the patient on CT results.  Laceration was repaired with Steri-Strips, bacitracin ointment, and a Band-Aid.  Patient was given proper wound care  instructions. Tdap updated today.  Patient verbalized understanding and agreement with this plan of care. He was given specific return precautions. Advised to follow up with PCP as needed. All questions and concerns addressed. He is stable for discharge.     This note was created with MModal Fluency Direct Dictation. Please excuse any spelling or grammatical errors.      Clinical Impression:   Final diagnoses:  [S61.012A] Laceration of left thumb without foreign body without damage to nail, initial encounter (Primary)        ED Disposition Condition    Discharge Stable          ED Prescriptions    None       Follow-up Information    None          German Rosales PA-C  11/07/23 1825       German Rosales PA-C  11/07/23 1826

## 2023-11-06 NOTE — DISCHARGE INSTRUCTIONS
Please keep the area clean and dry. You can clean with soap and water starting tomorrow. Apply steri strips and antibiotic ointment as needed.

## 2023-11-06 NOTE — ED TRIAGE NOTES
PT arrived with c/o laceration to L thumb and syncope.  Pt states he was moving boxes and accidentally cut proximal knuckle of L thumb on some metal.  Pt states he went to clean blood off thumb in bathroom and became very hot and diaphoretic.  The next thing he knew, he woke up on bathroom floor with pain to the back of his head.  Pt endorses mild SOB upon awakening.  Denies any chest discomfort.  Denies any dizziness or visual changes at present.  Denies taking any blood thinners, last tetanus 2016.  Pt answering questions appropriately, speaking in complete sentences, respirations even and unlabored.  Aao x 4.

## 2023-11-06 NOTE — ED NOTES
Pt. Has a 1cm. laceration to the left thumb. Pt. States yesterday he was moving furniture and cut his thumb on a piece of metal. Last T-Dap unkown. Bleeding is controlled. Pt.is alert and a GCS of 15 with no further complaints.

## 2023-12-01 ENCOUNTER — NURSE TRIAGE (OUTPATIENT)
Dept: ADMINISTRATIVE | Facility: CLINIC | Age: 18
End: 2023-12-01

## 2023-12-02 NOTE — TELEPHONE ENCOUNTER
Pt reports feels a lump in his scrotum and a painless rash, Pt advised to see a MD within 3 days per protocol via PCP office/UC/ED, Pt encouraged to call back with any worsening symptoms or questions. Pt verbalized understanding.    Reason for Disposition   [1] Painless rash (e.g., redness, tiny bumps, sore) AND [2] present > 24 hours    Additional Information   Negative: [1] Blood from end of penis AND [2] large amount   Negative: [1] Not circumcised AND [2] foreskin pulled back and stuck   Negative: [1] Looks infected (e.g., draining sore, ulcer, rash is painful to touch) AND [2] fever > 100.4 F (38.0 C)   Negative: [1] Unable to urinate (or only a few drops) > 4 hours AND [2] bladder feels very full (e.g., palpable bladder or strong urge to urinate)   Negative: [1] Prolonged unwanted erection (i.e., not related to sexual interest) AND [2] lasting > 4 hours   Negative: SEVERE pain (e.g., excruciating)   Negative: Patient sounds very sick or weak to the triager   Negative: [1] Pus or bloody discharge from the end of the penis AND [2] fever   Negative: [1] Pain or burning with passing urine AND [2] fever > 100.4 F (38.0 C)   Negative: [1] Pain or burning with passing urine AND [2] side (flank) or back pain present   Negative: Entire penis is swollen (i.e., edema)   Negative: [1] Antibiotic treatment > 3 days for STI (e.g., penile discharge from gonorrhea, chlamydia) AND [2] painful urination not improved   Negative: Pus (white, yellow) or bloody discharge from end of penis   Negative: Pain or burning with passing urine   Negative: [1] Not circumcised AND [2] swollen foreskin   Negative: [1] Tiny water blisters rash AND [2] 3 or more   Negative: Looks infected (e.g., draining sore, ulcer, rash is painful to touch)   Negative: Blood in urine (red, pink, or tea-colored)   Negative: Severe itching    Protocols used: Penis and Scrotum Symptoms-A-AH

## 2024-01-10 ENCOUNTER — IMMUNIZATION (OUTPATIENT)
Dept: URGENT CARE | Facility: CLINIC | Age: 19
End: 2024-01-10
Payer: MEDICAID

## 2024-01-10 ENCOUNTER — OCCUPATIONAL HEALTH (OUTPATIENT)
Dept: URGENT CARE | Facility: CLINIC | Age: 19
End: 2024-01-10
Payer: MEDICAID

## 2024-01-10 VITALS — TEMPERATURE: 99 F

## 2024-01-10 DIAGNOSIS — A15.9 TUBERCULOSIS: Primary | ICD-10-CM

## 2024-01-10 DIAGNOSIS — Z23 IMMUNIZATION DUE: Primary | ICD-10-CM

## 2024-01-10 PROCEDURE — 86580 TB INTRADERMAL TEST: CPT | Mod: S$GLB,,,

## 2024-01-10 PROCEDURE — 90686 IIV4 VACC NO PRSV 0.5 ML IM: CPT | Mod: S$GLB,,,

## 2024-01-10 PROCEDURE — 90471 IMMUNIZATION ADMIN: CPT | Mod: S$GLB,,,

## 2024-01-11 NOTE — PROGRESS NOTES
Subjective:      Patient ID: Daniel Ernst is a 18 y.o. male.    Vitals:  vitals were not taken for this visit.     Chief Complaint: Immunizations    Pt came in for flu vaccine only.  ROS   Objective:     Physical Exam    Assessment:     No diagnosis found.    Plan:       There are no diagnoses linked to this encounter.

## 2024-01-11 NOTE — PROGRESS NOTES
Subjective:      Patient ID: Daniel Ernst is a 18 y.o. male.    Vitals:  vitals were not taken for this visit.     Chief Complaint: No chief complaint on file.    Pt came in for PPD test only   ROS   Objective:     Physical Exam    Assessment:     No diagnosis found.    Plan:       There are no diagnoses linked to this encounter.

## 2024-01-20 ENCOUNTER — OCCUPATIONAL HEALTH (OUTPATIENT)
Dept: URGENT CARE | Facility: CLINIC | Age: 19
End: 2024-01-20
Payer: MEDICAID

## 2024-01-20 DIAGNOSIS — Z13.9 ENCOUNTER FOR SCREENING: Primary | ICD-10-CM

## 2024-01-20 PROCEDURE — 86580 TB INTRADERMAL TEST: CPT | Mod: S$GLB,,, | Performed by: PHYSICIAN ASSISTANT

## 2024-01-20 NOTE — PROGRESS NOTES
Pt is here for a PPD placement and was notified on when to return for PPD reading. Pt missed the time frame for 1/10 PPD placement and was given another one on today. Pt waited 72 hrs.

## 2024-01-23 LAB
TB INDURATION - 48 HR READ: NORMAL
TB INDURATION - 72 HR READ: 0 MM
TB SKIN TEST - 48 HR READ: NORMAL
TB SKIN TEST - 72 HR READ: NEGATIVE

## 2024-03-08 ENCOUNTER — TELEPHONE (OUTPATIENT)
Dept: OPHTHALMOLOGY | Facility: CLINIC | Age: 19
End: 2024-03-08
Payer: COMMERCIAL

## 2024-03-08 ENCOUNTER — HOSPITAL ENCOUNTER (EMERGENCY)
Facility: OTHER | Age: 19
Discharge: HOME | End: 2024-03-08
Attending: EMERGENCY MEDICINE
Payer: MEDICAID

## 2024-03-08 VITALS
BODY MASS INDEX: 21.63 KG/M2 | SYSTOLIC BLOOD PRESSURE: 121 MMHG | TEMPERATURE: 98 F | WEIGHT: 126 LBS | DIASTOLIC BLOOD PRESSURE: 68 MMHG | HEART RATE: 60 BPM | RESPIRATION RATE: 16 BRPM | OXYGEN SATURATION: 100 %

## 2024-03-08 DIAGNOSIS — M25.511 ACUTE PAIN OF RIGHT SHOULDER: ICD-10-CM

## 2024-03-08 DIAGNOSIS — S05.12XA CONTUSION OF LEFT ORBITAL TISSUES, INITIAL ENCOUNTER: Primary | ICD-10-CM
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
## 2024-03-08 PROCEDURE — 99284 EMERGENCY DEPT VISIT MOD MDM: CPT | Mod: 25

## 2024-03-08 PROCEDURE — 25000003 PHARM REV CODE 250: Performed by: EMERGENCY MEDICINE

## 2024-03-08 RX ORDER — GENTAMICIN SULFATE 3 MG/ML
2 SOLUTION/ DROPS OPHTHALMIC
Status: COMPLETED | OUTPATIENT
Start: 2024-03-08 | End: 2024-03-08

## 2024-03-08 RX ORDER — IBUPROFEN 600 MG/1
600 TABLET ORAL EVERY 6 HOURS PRN
Qty: 20 TABLET | Refills: 0 | Status: SHIPPED | OUTPATIENT
Start: 2024-03-08

## 2024-03-08 RX ORDER — ACETAMINOPHEN 500 MG
1000 TABLET ORAL
Status: COMPLETED | OUTPATIENT
Start: 2024-03-08 | End: 2024-03-08

## 2024-03-08 RX ORDER — ACETAMINOPHEN 500 MG
1000 TABLET ORAL EVERY 6 HOURS PRN
Qty: 50 TABLET | Refills: 0 | Status: SHIPPED | OUTPATIENT
Start: 2024-03-08

## 2024-03-08 RX ORDER — IBUPROFEN 600 MG/1
600 TABLET ORAL
Status: COMPLETED | OUTPATIENT
Start: 2024-03-08 | End: 2024-03-08

## 2024-03-08 RX ADMIN — ACETAMINOPHEN 1000 MG: 500 TABLET ORAL at 10:03

## 2024-03-08 RX ADMIN — GENTAMICIN SULFATE 2 DROP: 3 SOLUTION/ DROPS OPHTHALMIC at 12:03

## 2024-03-08 RX ADMIN — IBUPROFEN 600 MG: 600 TABLET, FILM COATED ORAL at 10:03

## 2024-03-08 NOTE — DISCHARGE INSTRUCTIONS
Mr. Ernst,    Your X ray and CT show no broken bones    Thank you for letting me care for you today! It was nice meeting you, and I hope you feel better soon.   If you would like access to your chart and what was done today please utilize the [facility].   Please come back to [facility] for all of your future medical needs.    Our goal in the emergency department is to always give you outstanding care and exceptional service. You may receive a survey by mail or e-mail in the next week regarding your experience in our ED. We would greatly appreciate you completing and returning the survey. Your feedback provides us with a way to recognize our staff who give very good care and it helps us learn how to improve when your experience was below our aspiration of excellence.     Sincerely,    Tariq Donohue MD  Board Certified Emergency Physician

## 2024-03-08 NOTE — Clinical Note
"Daniel Pangs" Klever was seen and treated in our emergency department on 3/8/2024.  He may return to work on 03/11/2024.       If you have any questions or concerns, please don't hesitate to call.      Tariq Donohue MD"
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
## 2024-03-16 ENCOUNTER — HOSPITAL ENCOUNTER (EMERGENCY)
Facility: HOSPITAL | Age: 19
Discharge: HOME | End: 2024-03-16
Attending: STUDENT IN AN ORGANIZED HEALTH CARE EDUCATION/TRAINING PROGRAM

## 2024-03-16 VITALS
RESPIRATION RATE: 19 BRPM | HEART RATE: 71 BPM | OXYGEN SATURATION: 97 % | HEIGHT: 66 IN | BODY MASS INDEX: 20.09 KG/M2 | TEMPERATURE: 99 F | SYSTOLIC BLOOD PRESSURE: 118 MMHG | DIASTOLIC BLOOD PRESSURE: 57 MMHG | WEIGHT: 125 LBS

## 2024-03-16 DIAGNOSIS — H10.9 CONJUNCTIVITIS OF LEFT EYE, UNSPECIFIED CONJUNCTIVITIS TYPE: Primary | ICD-10-CM

## 2024-03-16 PROCEDURE — 99283 EMERGENCY DEPT VISIT LOW MDM: CPT

## 2024-03-16 RX ORDER — CIPROFLOXACIN HYDROCHLORIDE 3 MG/ML
1 SOLUTION/ DROPS OPHTHALMIC
Qty: 10 ML | Refills: 0 | Status: SHIPPED | OUTPATIENT
Start: 2024-03-16 | End: 2024-03-26

## 2024-03-16 RX ORDER — CARBOXYMETHYLCELLULOSE SODIUM 10 MG/ML
15 SOLUTION/ DROPS OPHTHALMIC
Qty: 15 ML | Refills: 0 | Status: SHIPPED | OUTPATIENT
Start: 2024-03-16 | End: 2024-03-26

## 2024-03-16 NOTE — Clinical Note
"Daniel Sim" Klever was seen and treated in our emergency department on 3/16/2024.  He may return to work on 03/18/2024.       If you have any questions or concerns, please don't hesitate to call.      Emile Saez MD"

## 2024-03-17 NOTE — DISCHARGE INSTRUCTIONS

## 2024-03-17 NOTE — ED PROVIDER NOTES
Encounter Date: 3/16/2024       History     Chief Complaint   Patient presents with    Eye Pain     Left eye redness, swelling, itching for 1 wk     18-year-old male with left eye redness and itching for the last week.  He says he recently had URI symptoms.  He also says that he bumped in the eye by a friend a few days ago.  No prior history of intravitreal injections or instrumentation.      Review of patient's allergies indicates:  No Known Allergies  No past medical history on file.  No past surgical history on file.  No family history on file.     Review of Systems    Physical Exam     Initial Vitals [03/16/24 2028]   BP Pulse Resp Temp SpO2   (!) 118/57 71 19 98.7 °F (37.1 °C) 97 %      MAP       --         Physical Exam    Nursing note and vitals reviewed.  Constitutional: He appears well-developed and well-nourished.   HENT:   Head: Normocephalic and atraumatic.   Eyes: EOM are normal. Pupils are equal, round, and reactive to light.   Left eye with scleral injection.  No hyphema.  No hypopyon.  Fluorescein testing showing no uptake.  No foreign body on examination   Neck: Neck supple. No JVD present.   Normal range of motion.  Cardiovascular:  Normal rate and regular rhythm.           Pulmonary/Chest: No stridor.   Musculoskeletal:      Cervical back: Normal range of motion and neck supple.           ED Course   Procedures  Labs Reviewed   HIV 1 / 2 ANTIBODY   HEPATITIS C ANTIBODY          Imaging Results    None          Medications - No data to display  Medical Decision Making  As above.  Will treat as conjunctivitis with ophtho drops and refer to Ophthalmology.    Risk  OTC drugs.  Prescription drug management.                                      Clinical Impression:  Final diagnoses:  [H10.9] Conjunctivitis of left eye, unspecified conjunctivitis type (Primary)          ED Disposition Condition    Discharge Stable          ED Prescriptions       Medication Sig Dispense Start Date End Date Auth. Provider     ciprofloxacin HCl (CILOXAN) 0.3 % ophthalmic solution Place 1 drop into the left eye every 2 (two) hours. for 10 days 10 mL 3/16/2024 3/26/2024 Emile Saez MD    carboxymethylcellulose sodium (ARTIFICIAL TEARS, CMC,) 1 % Drop Apply 15 mLs to eye every 2 (two) hours. for 10 days 15 mL 3/16/2024 3/26/2024 Emile Saez MD          Follow-up Information       Follow up With Specialties Details Why Contact Info    [facility] - Emergency Dept Emergency Medicine Go to  As needed [id number]  [facility] [id number] [phone]             Emile Saez MD  03/16/24 9269

## 2024-03-17 NOTE — ED TRIAGE NOTES
Daniel Ernst, a 18 y.o. male presents to the ED w/ complaint of left eye redness/drainage and pain x 1 week    Triage note:  Chief Complaint   Patient presents with    Eye Pain     Left eye redness, swelling, itching for 1 wk     Review of patient's allergies indicates:  No Known Allergies  No past medical history on file.     LOC: The patient is awake, alert, aware of environment with an appropriate affect. Oriented x4, speaking appropriately  APPEARANCE: Pt resting comfortably, in no acute distress, pt is clean and well groomed, clothing properly fastened  SKIN:The skin is warm and dry, color consistent with ethnicity, patient has normal skin turgor and moist mucus membranes, no bruising noted   RESPIRATORY:Airway is open and patent, respirations are spontaneous, patient has a normal effort and rate, no accessory muscle use noted.  CARDIAC: Normal rate and rhythm, no peripheral edema noted  ABDOMEN: Soft, non tender, non distended. Denies n/v/d  NEUROLOGIC: PERRLA, facial expression is symmetrical, patient moving all extremities spontaneously, normal sensation in all extremities when touched with a finger.  Follows all commands appropriately  MUSCULOSKELETAL: Patient moving all extremities spontaneously, no obvious swelling or deformities noted.

## 2024-07-18 ENCOUNTER — HOSPITAL ENCOUNTER (EMERGENCY)
Facility: HOSPITAL | Age: 19
Discharge: HOME | End: 2024-07-18
Attending: STUDENT IN AN ORGANIZED HEALTH CARE EDUCATION/TRAINING PROGRAM

## 2024-07-18 VITALS
DIASTOLIC BLOOD PRESSURE: 74 MMHG | WEIGHT: 125 LBS | HEIGHT: 66 IN | TEMPERATURE: 99 F | HEART RATE: 51 BPM | RESPIRATION RATE: 16 BRPM | OXYGEN SATURATION: 99 % | SYSTOLIC BLOOD PRESSURE: 130 MMHG | BODY MASS INDEX: 20.09 KG/M2

## 2024-07-18 DIAGNOSIS — M25.512 LEFT SHOULDER PAIN: ICD-10-CM

## 2024-07-18 DIAGNOSIS — M25.532 LEFT WRIST PAIN: ICD-10-CM

## 2024-07-18 DIAGNOSIS — R36.9 PENILE DISCHARGE: Primary | ICD-10-CM

## 2024-07-18 LAB
BACTERIA #/AREA URNS HPF: ABNORMAL /HPF
BILIRUB UR QL STRIP: NEGATIVE
CLARITY UR: ABNORMAL
COLOR UR: YELLOW
GLUCOSE UR QL STRIP: NEGATIVE
HGB UR QL STRIP: ABNORMAL
HYALINE CASTS #/AREA URNS LPF: 0 /LPF
KETONES UR QL STRIP: NEGATIVE
LEUKOCYTE ESTERASE UR QL STRIP: ABNORMAL
MICROSCOPIC COMMENT: ABNORMAL
NITRITE UR QL STRIP: NEGATIVE
PH UR STRIP: 7 [PH] (ref 5–8)
PROT UR QL STRIP: ABNORMAL
RBC #/AREA URNS HPF: 55 /HPF (ref 0–4)
SP GR UR STRIP: 1.03 (ref 1–1.03)
URN SPEC COLLECT METH UR: ABNORMAL
UROBILINOGEN UR STRIP-ACNC: NEGATIVE EU/DL
WBC #/AREA URNS HPF: >100 /HPF (ref 0–5)

## 2024-07-18 PROCEDURE — 87086 URINE CULTURE/COLONY COUNT: CPT | Performed by: PHYSICIAN ASSISTANT

## 2024-07-18 PROCEDURE — 25000003 PHARM REV CODE 250: Performed by: NURSE PRACTITIONER

## 2024-07-18 PROCEDURE — 93005 ELECTROCARDIOGRAM TRACING: CPT

## 2024-07-18 PROCEDURE — 87591 N.GONORRHOEAE DNA AMP PROB: CPT | Performed by: PHYSICIAN ASSISTANT

## 2024-07-18 PROCEDURE — 96372 THER/PROPH/DIAG INJ SC/IM: CPT | Performed by: NURSE PRACTITIONER

## 2024-07-18 PROCEDURE — 87491 CHLMYD TRACH DNA AMP PROBE: CPT | Performed by: PHYSICIAN ASSISTANT

## 2024-07-18 PROCEDURE — 99284 EMERGENCY DEPT VISIT MOD MDM: CPT | Mod: 25

## 2024-07-18 PROCEDURE — 63600175 PHARM REV CODE 636 W HCPCS: Performed by: NURSE PRACTITIONER

## 2024-07-18 PROCEDURE — 81000 URINALYSIS NONAUTO W/SCOPE: CPT | Performed by: PHYSICIAN ASSISTANT

## 2024-07-18 PROCEDURE — 93010 ELECTROCARDIOGRAM REPORT: CPT | Mod: ,,, | Performed by: INTERNAL MEDICINE

## 2024-07-18 RX ORDER — DOXYCYCLINE HYCLATE 100 MG
100 TABLET ORAL
Status: COMPLETED | OUTPATIENT
Start: 2024-07-18 | End: 2024-07-18

## 2024-07-18 RX ORDER — NAPROXEN 500 MG/1
500 TABLET ORAL
Status: COMPLETED | OUTPATIENT
Start: 2024-07-18 | End: 2024-07-18

## 2024-07-18 RX ORDER — DOXYCYCLINE 100 MG/1
100 CAPSULE ORAL 2 TIMES DAILY
Qty: 14 CAPSULE | Refills: 0 | Status: SHIPPED | OUTPATIENT
Start: 2024-07-18

## 2024-07-18 RX ORDER — NAPROXEN 500 MG/1
500 TABLET ORAL 2 TIMES DAILY WITH MEALS
Qty: 14 TABLET | Refills: 0 | Status: SHIPPED | OUTPATIENT
Start: 2024-07-18

## 2024-07-18 RX ORDER — CYCLOBENZAPRINE HCL 10 MG
10 TABLET ORAL 3 TIMES DAILY PRN
Qty: 15 TABLET | Refills: 0 | Status: SHIPPED | OUTPATIENT
Start: 2024-07-18 | End: 2024-07-23

## 2024-07-18 RX ADMIN — NAPROXEN 500 MG: 500 TABLET ORAL at 08:07

## 2024-07-18 RX ADMIN — CEFTRIAXONE 500 MG: 1 INJECTION, POWDER, FOR SOLUTION INTRAMUSCULAR; INTRAVENOUS at 08:07

## 2024-07-18 RX ADMIN — DOXYCYCLINE HYCLATE 100 MG: 100 TABLET, COATED ORAL at 08:07

## 2024-07-19 LAB
OHS QRS DURATION: 94 MS
OHS QTC CALCULATION: 371 MS

## 2024-07-19 NOTE — DISCHARGE INSTRUCTIONS
Take antibiotics as ordered. You have been prescribed naprosyn (naproxen sodium), an anti-inflammatory.  Take this medication whether you feel you need it or not.  Do not take ibuprofen, naproxen or other NSAID's medications while taking this medication. You have been prescribed flexeril (cyclobenzaprine).  You have been given a medication that causes drowsiness.  Do not operate motor vehicles, drink alcohol, or operate heavy machinery while taking this medication.You have been prescribed flexeril (cyclobenzaprine).  You have been given a medication that causes drowsiness.  Do not operate motor vehicles, drink alcohol, or operate heavy machinery while taking this medication. Return to the Emergency Department for any worsening, change in condition, or any emergent concerns.

## 2024-07-19 NOTE — ED PROVIDER NOTES
"Encounter Date: 7/18/2024       History     Chief Complaint   Patient presents with    Shoulder Pain     Patient reports via POV for left shoulder pain x 2-3 weeks.  Reports pain radiating down left arm.  Patient also reports of urinary frequency and dysuria x 2-3 weeks.  Patient notes a small amount of "red" to his urine.  Pt denies fever, chills, n/v/d, or abdominal pain.  No prior tx.      Chief complaint: Left shoulder pain     History of present illness: Patient is a 18-year-old male who reports 2-3 weeks of left arm pain at the shoulder and wrist after lifting a heavy object.  Thinks he may have dislocated either.  Reports pain is 6/10 in addition he reports dysuria frequency urgency and hematuria as well as penile discharge that he reports is "cloudy.  His girlfriend is present with him and states she is currently under treatment for STIs although her testing has not returned.  he denies fever and abdominal pain    The history is provided by the patient.     Review of patient's allergies indicates:  No Known Allergies  Past Medical History:   Diagnosis Date    Asthma      Past Surgical History:   Procedure Laterality Date    LAPAROSCOPIC APPENDECTOMY N/A 11/25/2019    Procedure: APPENDECTOMY, LAPAROSCOPIC;  Surgeon: Lynda Chau MD;  Location: [facility] OR [address] [place] [address];  Service: Pediatrics;  Laterality: N/A;     No family history on file.  Social History     Tobacco Use    Smoking status: Never     Passive exposure: Yes   Substance Use Topics    Alcohol use: Not Currently    Drug use: Never     Review of Systems   Constitutional:  Negative for fever.   Gastrointestinal:  Negative for abdominal pain.   Genitourinary:  Positive for dysuria, frequency, penile discharge and urgency.   Musculoskeletal:  Negative for back pain.       Physical Exam     Initial Vitals [07/18/24 1828]   BP Pulse Resp Temp SpO2   123/64 83 16 98.5 °F (36.9 °C) 99 %      MAP       --         Physical Exam    Nursing note and vitals " reviewed.  Constitutional: He appears well-developed and well-nourished. He is not diaphoretic. No distress.   HENT:   Head: Normocephalic and atraumatic.   Right Ear: External ear normal.   Left Ear: External ear normal.   Nose: Nose normal.   Eyes: Pupils are equal, round, and reactive to light. Right eye exhibits no discharge. Left eye exhibits no discharge. No scleral icterus.   Neck:   Normal range of motion.  Pulmonary/Chest: No respiratory distress.   Abdominal: He exhibits no distension.   Musculoskeletal:         General: Normal range of motion.      Cervical back: Normal range of motion.      Comments: Full range of motion of the left shoulder left elbow left wrist are noted.  Distal P SM is intact.     Neurological: He is alert and oriented to person, place, and time.   Skin: Skin is dry. Capillary refill takes less than 2 seconds.         ED Course   Procedures  Labs Reviewed   URINALYSIS, REFLEX TO URINE CULTURE - Abnormal; Notable for the following components:       Result Value    Appearance, UA Hazy (*)     Protein, UA 1+ (*)     Occult Blood UA 1+ (*)     Leukocytes, UA 3+ (*)     All other components within normal limits    Narrative:     Specimen Source->Urine   URINALYSIS MICROSCOPIC - Abnormal; Notable for the following components:    RBC, UA 55 (*)     WBC, UA >100 (*)     All other components within normal limits    Narrative:     Specimen Source->Urine   CULTURE, URINE   C. TRACHOMATIS/N. GONORRHOEAE BY AMP DNA          Imaging Results              X-Ray Wrist Complete Left (Final result)  Result time 07/18/24 20:25:04      Final result by Anahi Yung MD (07/18/24 20:25:04)                   Impression:      No acute fracture.      Electronically signed by: Anahi Yung  Date:    07/18/2024  Time:    20:25               Narrative:    EXAMINATION:  THREE VIEWS OF THE LEFT WRIST    CLINICAL HISTORY:  Pain in left wrist    TECHNIQUE:  AP, oblique, and lateral views of the left  wrist    COMPARISON:  None.    FINDINGS:  Three views of the left wrist demonstrate no acute fracture or dislocation.                                       X-Ray Shoulder Trauma Left (Final result)  Result time 07/18/24 20:18:03      Final result by Elmo Schafer MD (07/18/24 20:18:03)                   Impression:      1. No acute displaced fracture or dislocation of the left shoulder.      Electronically signed by: Elmo Schafer MD  Date:    07/18/2024  Time:    20:18               Narrative:    EXAMINATION:  XR SHOULDER TRAUMA 3 VIEW LEFT    CLINICAL HISTORY:  Pain in left shoulder    TECHNIQUE:  Three views of the left shoulder were performed.    COMPARISON  None    FINDINGS:  The left humeral head maintains appropriate relationship with the glenoid.  The acromioclavicular joint is intact.  No acute displaced left rib fracture.  The left lung zones are clear.                                       X-Ray Chest PA And Lateral (Final result)  Result time 07/18/24 20:01:37      Final result by Anahi Yung MD (07/18/24 20:01:37)                   Impression:      No acute intrathoracic abnormality.      Electronically signed by: Anahi Yung  Date:    07/18/2024  Time:    20:01               Narrative:    EXAMINATION:  CHEST PA AND LATERAL    CLINICAL HISTORY:  Pain in left shoulder    TECHNIQUE:  PA and lateral chest radiograph    COMPARISON:  <None.>    FINDINGS:  The cardiac silhouette is within normal limits.  There is no focal consolidation, pneumothorax, or pleural effusion.                                       Medications   naproxen tablet 500 mg (500 mg Oral Given 7/18/24 2018)   cefTRIAXone (Rocephin) 500 mg in LIDOcaine HCL 10 mg/ml (1%) 2 mL IM only syringe (500 mg Intramuscular Given 7/18/24 2018)   doxycycline tablet 100 mg (100 mg Oral Given 7/18/24 2019)     Medical Decision Making  Patient is a 18-year-old male who reports 2-3 weeks of left arm pain at the shoulder and wrist after  "lifting a heavy object.  Thinks he may have dislocated either.  Reports pain is 6/10 in addition he reports dysuria frequency urgency and hematuria as well as penile discharge that he reports is "cloudy.  His girlfriend is present with him and states she is currently under treatment for STIs although her testing has not returned.  he denies fever and abdominal pain    Full range of motion of the left shoulder left elbow left wrist are noted.  Distal P SM is intact.  exam deferred.    Differential diagnosis includes UTI STI fracture dislocation sprain strain        Problems Addressed:  Left shoulder pain: acute illness or injury     Details: For musculoskeletal elements of the left shoulder and wrist I recommend the use of naproxen over-the-counter and Flexeril.  Left wrist pain: acute illness or injury  Penile discharge: acute illness or injury     Details: Likely STI.  Given Rocephin in the ED discharged on doxycycline.  Culture is pending.  Follow up as directed.    Amount and/or Complexity of Data Reviewed  Labs: ordered. Decision-making details documented in ED Course.  Radiology: ordered. Decision-making details documented in ED Course.  ECG/medicine tests: ordered. Decision-making details documented in ED Course.  Discussion of management or test interpretation with external provider(s): Vital signs at the time of disposition were:  /74 (BP Location: Left arm, Patient Position: Sitting)   Pulse (!) 51   Temp 98.5 °F (36.9 °C)   Resp 16   Ht 5' 6" (1.676 m)   Wt 56.7 kg (125 lb)   SpO2 99%   BMI 20.18 kg/m²       See AVS for additional recommendations. Medications listed herein were prescribed after reviewing the patient's allergies, medication list, history, most recent laboratories as available.  Referrals below were provided after reviewing the patient's previous medical providers. He understands he  should return for any worsening or changes in condition.  Prior to discharge the patient was " asked if he  had any additional concerns or complaints and he declined. The patient was given an opportunity to ask questions and all were answered to his satisfaction.     Risk  Prescription drug management.  Diagnosis or treatment significantly limited by social determinants of health.               ED Course as of 07/18/24 2306   Thu Jul 18, 2024 2000 Urinalysis Microscopic(!)  Positive for UTI.  Patient reports penile discharge.  We will give Rocephin and doxycycline. [VC]   2000 BP: 123/64 [VC]   2000 Temp: 98.5 °F (36.9 °C) [VC]   2000 Temp Source: Oral [VC]   2000 Pulse: 83 [VC]   2000 Resp: 16 [VC]   2000 SpO2: 99 % [VC]   2007 EKG 12-lead  No STEMI. [LP]   2022 X-Ray Shoulder Trauma Left    1. No acute displaced fracture or dislocation of the left shoulder.      [VC]   2022 X-Ray Chest PA And Lateral    No acute intrathoracic abnormality.      [VC]   2036 X-Ray Wrist Complete Left    No acute fracture.    [VC]      ED Course User Index  [LP] Rigoberto Hoang III, MD  [VC] Gasper Valerio DNP                           Clinical Impression:  Final diagnoses:  [M25.512] Left shoulder pain  [M25.532] Left wrist pain  [R36.9] Penile discharge (Primary)          ED Disposition Condition    Discharge Stable          ED Prescriptions       Medication Sig Dispense Start Date End Date Auth. Provider    doxycycline (MONODOX) 100 MG capsule Take 1 capsule (100 mg total) by mouth 2 (two) times daily. 14 capsule 7/18/2024 -- Gasper Valerio DNP    naproxen (NAPROSYN) 500 MG tablet Take 1 tablet (500 mg total) by mouth 2 (two) times daily with meals. 14 tablet 7/18/2024 -- Gasper Valerio DNP    cyclobenzaprine (FLEXERIL) 10 MG tablet Take 1 tablet (10 mg total) by mouth 3 (three) times daily as needed for Muscle spasms. 15 tablet 7/18/2024 7/23/2024 Gasper Valerio DNP          Follow-up Information       Follow up With Specialties Details Why Contact [facility]  Internal Medicine, Family Medicine Schedule an appointment as soon as possible for a visit   [address] [facility] [id number]  [phone]               Gasper Valerio, [place]  07/18/24 4751

## 2024-07-19 NOTE — ED NOTES
Patient having left sided shoulder pain x 2 weeks. Patient has old shoulder injury from fall. He states he was lifting something 2 weeks ago and it feels like his shoulder popped out of place. Pain 6/10. Denies numbness and tingling in his arms/wrists at this time. +2 radial pulses palpated bilaterally. Denies any other complaints at this time. Negative PMH per patient report. NKDA.

## 2024-07-20 LAB — BACTERIA UR CULT: NO GROWTH

## 2024-07-23 LAB
C TRACH DNA SPEC QL NAA+PROBE: DETECTED
N GONORRHOEA DNA SPEC QL NAA+PROBE: NOT DETECTED

## 2024-10-08 DIAGNOSIS — Z20.2 EXPOSURE TO CHLAMYDIA: Primary | ICD-10-CM

## 2024-10-08 RX ORDER — AZITHROMYCIN 500 MG/1
1000 TABLET, FILM COATED ORAL DAILY
Qty: 2 TABLET | Refills: 0 | Status: SHIPPED | OUTPATIENT
Start: 2024-10-08

## 2024-11-08 ENCOUNTER — OCCUPATIONAL HEALTH (OUTPATIENT)
Dept: URGENT CARE | Facility: CLINIC | Age: 19
End: 2024-11-08

## 2024-11-08 DIAGNOSIS — Z13.9 ENCOUNTER FOR SCREENING: Primary | ICD-10-CM

## (undated) DEVICE — SUT PLAIN 5-0 FS2 27IN YEL

## (undated) DEVICE — SPONGE DERMA 8PLY 2X2

## (undated) DEVICE — SYR 10CC LUER LOCK

## (undated) DEVICE — TROCAR ENDOPATH XCEL 12X100MM

## (undated) DEVICE — DRESSING TRANS 2X2 TEGADERM

## (undated) DEVICE — DRAPE STERI-DRAPE 1000 17X11IN

## (undated) DEVICE — TRAY MINOR GEN SURG

## (undated) DEVICE — GLOVE PI ULTRA TOUCH G SURGEON

## (undated) DEVICE — TUBING HF INSUFFLATION W/ FLTR

## (undated) DEVICE — BLADE SURG STAINLESS STEEL #15

## (undated) DEVICE — SUT MONOCRYL 5-0 P-3 UND 18

## (undated) DEVICE — KIT ANTIFOG

## (undated) DEVICE — GOWN SURGICAL X-LARGE

## (undated) DEVICE — ADHESIVE MASTISOL VIAL 48/BX

## (undated) DEVICE — CLOSURE SKIN STERI STRIP 1/2X4

## (undated) DEVICE — BLADE ELECTRO EDGE INSULATED

## (undated) DEVICE — TRAY FOLEY 16FR INFECTION CONT